# Patient Record
Sex: FEMALE | Race: WHITE | NOT HISPANIC OR LATINO | Employment: OTHER | URBAN - METROPOLITAN AREA
[De-identification: names, ages, dates, MRNs, and addresses within clinical notes are randomized per-mention and may not be internally consistent; named-entity substitution may affect disease eponyms.]

---

## 2022-11-14 ENCOUNTER — TELEPHONE (OUTPATIENT)
Dept: PSYCHIATRY | Facility: CLINIC | Age: 72
End: 2022-11-14

## 2022-11-14 NOTE — TELEPHONE ENCOUNTER
Spoke to  Maria Guadalupe from [facility] about scheduling patient for [facility] services for after discharge approximately on 11/16   Message sent to  for scheduling

## 2022-11-15 NOTE — TELEPHONE ENCOUNTER
Left  for arthur letting her know patient would have to be on the waitlist  Office number given to call back